# Patient Record
(demographics unavailable — no encounter records)

---

## 2024-10-08 NOTE — ASSESSMENT
[FreeTextEntry1] : 16 year  old woman  with sleep apnea is doing well with the CPAP.  Patient is compliant with the CPAP and benefited significantly with the CPAP.

## 2024-10-08 NOTE — HISTORY OF PRESENT ILLNESS
[FreeTextEntry1] : Dr. Leticia Henriquez 16 year old woman with history of pcos, anxiety is here in the sleep center to address excessive daytime sleepiness.  symptoms prior to cpap - Patient is sleepy with [place] sleepiness score of 15 to a point that is affecting her school.  Patient does not snore, does not have any witnessed apneas.  Patient's bedtime is around 9 PM wakes up in the morning around 9 AM.   She feels tired when she wakes up even with many of hours of sleep.  Patient does not drink caffienated products. Patient does not have any headaches or nocturia. She does not drive. Patient has frequent sleep hallucinations, no sleep paralysis or cataplexy. Due to excessive daytime sleepiness patient underwent sleep study which showed ahi of 3.6 (for pediatrics its considered as mild apnea) her RDI is 20.5 (considered as moderate degree of sleep disordered breathing). also noted to have periodic limb movement disorder moderate range. BMI 46  symptoms on the cpap - Patient is not sleepy with [place] sleepiness score of 4, and is doing better in school.  Patient does not snore, does not have any witnessed apneas.  Patient's bedtime is around 9 PM wakes up in the morning around 6 AM.   She feels rested when she wakes up.  Patient does not drink caffienated products. Patient does not have any headaches or nocturia. She does not drive.  Due to excessive daytime sleepiness patient underwent sleep study which showed ahi of 3.6 (for pediatrics its considered as mild apnea) her RDI is 20.5 (considered as moderate degree of sleep disordered breathing).  patient received cpap and is doing well with it ahi 1.4 pressure 4-15 cm usage 6 hrs uses fullface mask dme margoth